# Patient Record
Sex: MALE | Race: WHITE | URBAN - METROPOLITAN AREA
[De-identification: names, ages, dates, MRNs, and addresses within clinical notes are randomized per-mention and may not be internally consistent; named-entity substitution may affect disease eponyms.]

---

## 2022-10-20 ENCOUNTER — OFFICE VISIT (OUTPATIENT)
Dept: URBAN - METROPOLITAN AREA CLINIC 10 | Facility: CLINIC | Age: 42
End: 2022-10-20
Payer: MEDICAID

## 2022-10-20 DIAGNOSIS — H35.413 LATTICE DEGENERATION OF RETINA, BILATERAL: ICD-10-CM

## 2022-10-20 DIAGNOSIS — H52.4 PRESBYOPIA: ICD-10-CM

## 2022-10-20 DIAGNOSIS — H40.013 OPEN ANGLE WITH BORDERLINE FINDINGS, LOW RISK, BILATERAL: Primary | ICD-10-CM

## 2022-10-20 DIAGNOSIS — H40.023 OPEN ANGLE WITH BORDERLINE FINDINGS, HIGH RISK, BILATERAL: ICD-10-CM

## 2022-10-20 PROCEDURE — 92004 COMPRE OPH EXAM NEW PT 1/>: CPT | Performed by: OPTOMETRIST

## 2022-10-20 PROCEDURE — 92134 CPTRZ OPH DX IMG PST SGM RTA: CPT | Performed by: OPTOMETRIST

## 2022-10-20 PROCEDURE — 92133 CPTRZD OPH DX IMG PST SGM ON: CPT | Performed by: OPTOMETRIST

## 2022-10-20 RX ORDER — PILOCARPINE HYDROCHLORIDE 12.5 MG/ML
1.25 % SOLUTION/ DROPS OPHTHALMIC
Qty: 2.5 | Refills: 6 | Status: ACTIVE
Start: 2022-10-20

## 2022-10-20 ASSESSMENT — VISUAL ACUITY
OS: 20/20
OD: 20/25

## 2022-10-20 ASSESSMENT — INTRAOCULAR PRESSURE
OD: 17
OS: 25
OS: 19
OD: 27

## 2022-10-20 NOTE — IMPRESSION/PLAN
Impression: Presbyopia: H52.4. Plan: Pt. has used Vuity and appreciates improvement. Rx today for qday-bid OU use.

## 2022-10-20 NOTE — IMPRESSION/PLAN
Impression: Lattice degeneration of retina, bilateral: H35.413. Plan: RD precautions discussed. No associated breaks or tears. RTC STAT if noticed.

## 2022-10-20 NOTE — IMPRESSION/PLAN
Impression: Open angle with borderline findings, high risk, bilateral: H40.023. Plan: IOP elevated OU. +fhx (mother) Nerves are suspicious OU. 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 RTC 1-2 months for baseline HVF 24-2, pachs, gonio and IOP check. May consider tx at that time.